# Patient Record
Sex: MALE | Race: WHITE | NOT HISPANIC OR LATINO | URBAN - METROPOLITAN AREA
[De-identification: names, ages, dates, MRNs, and addresses within clinical notes are randomized per-mention and may not be internally consistent; named-entity substitution may affect disease eponyms.]

---

## 2021-10-12 ENCOUNTER — OFFICE VISIT (OUTPATIENT)
Dept: URBAN - METROPOLITAN AREA CLINIC 76 | Facility: CLINIC | Age: 76
End: 2021-10-12
Payer: MEDICARE

## 2021-10-12 DIAGNOSIS — H52.4 PRESBYOPIA: ICD-10-CM

## 2021-10-12 DIAGNOSIS — H25.13 AGE-RELATED NUCLEAR CATARACT, BILATERAL: Primary | ICD-10-CM

## 2021-10-12 DIAGNOSIS — H35.363 DRUSEN (DEGENERATIVE) OF MACULA, BILATERAL: ICD-10-CM

## 2021-10-12 PROCEDURE — 92134 CPTRZ OPH DX IMG PST SGM RTA: CPT | Performed by: OPTOMETRIST

## 2021-10-12 PROCEDURE — 99204 OFFICE O/P NEW MOD 45 MIN: CPT | Performed by: OPTOMETRIST

## 2021-10-12 ASSESSMENT — INTRAOCULAR PRESSURE
OS: 16
OD: 16

## 2021-10-12 ASSESSMENT — KERATOMETRY
OS: 46.88
OD: 47.00

## 2021-10-12 ASSESSMENT — VISUAL ACUITY
OS: 20/40
OD: 20/25

## 2021-10-12 NOTE — IMPRESSION/PLAN
Impression: Diagnosis: Age-related nuclear cataract, bilateral. Code: H25.13.

-Worse OS>OD
-Happy with vision OU. Plan: Cataracts account for the patient's complaints. Discussed options, surgery or spectacle change. Explained surgery risk, benefits and procedures. Patient elects to wait on cataract surgery at this time and keep current glasses. Continue to monitor for changes.

## 2021-10-12 NOTE — IMPRESSION/PLAN
Impression: Presbyopia: H52.4. Bilateral.

-Not able to make significant improvement OS with new MRx, pt understands Plan: Dispensed Rx for glasses to patient and instructed on normal adaptation period.

## 2021-10-12 NOTE — IMPRESSION/PLAN
Impression: Diagnosis: Drusen (degenerative) of macula, bilateral. Code: P96.966. Ordered/performed/reviewed baseline MAC OCT today.

-Negative FEH Plan: Discussed diagnosis with patient in detail. No treatment is required at this time. Will continue to observe condition and/or symptoms. Patient instructed to call if condition gets worse. Patient advised to RTC immediately if any vision changes occur.